# Patient Record
(demographics unavailable — no encounter records)

---

## 2017-08-07 DIAGNOSIS — N52.9 IMPOTENCE OF ORGANIC ORIGIN: ICD-10-CM

## 2017-08-07 DIAGNOSIS — I10 ESSENTIAL HYPERTENSION WITH GOAL BLOOD PRESSURE LESS THAN 140/90: ICD-10-CM

## 2017-08-07 DIAGNOSIS — E78.5 HYPERLIPIDEMIA LDL GOAL <130: ICD-10-CM

## 2017-08-07 NOTE — TELEPHONE ENCOUNTER
tadalafil (CIALIS) 20 MG tablet   Last Written Prescription Date: 07/14/2016  Last Fill Quantity: 8,  # refills: 11   Last Office Visit with [facility], [facility] [place] or [facility] prescribing provider: 10/31/2016                                             hydrochlorothiazide (HYDRODIURIL) 25 MG tablet       Last Written Prescription Date: 07/14/2016  Last Fill Quantity: 90, # refills: 3  Last Office Visit with [facility], [facility] [place] or [facility] prescribing provider: 10/31/2016       Potassium   Date Value Ref Range Status   07/14/2016 3.7 3.4 - 5.3 mmol/L Final     Creatinine   Date Value Ref Range Status   07/14/2016 0.80 0.66 - 1.25 mg/dL Final     BP Readings from Last 3 Encounters:   10/31/16 130/80   09/13/16 138/80   07/14/16 106/70     simvastatin (ZOCOR) 40 MG tablet       Last Written Prescription Date: 07/14/2016  Last Fill Quantity: 45, # refills: 3  Last Office Visit with [facility], [facility] [place] or [facility] prescribing provider: 10/31/2016       Lab Results   Component Value Date    CHOL 172 07/14/2016     Lab Results   Component Value Date    HDL 40 07/14/2016     Lab Results   Component Value Date     07/14/2016     Lab Results   Component Value Date    TRIG 137 07/14/2016     Lab Results   Component Value Date    CHOLHDLRATIO 3.5 04/03/2015

## 2017-08-08 RX ORDER — SIMVASTATIN 40 MG
20 TABLET ORAL AT BEDTIME
Qty: 45 TABLET | Refills: 0 | Status: SHIPPED | OUTPATIENT
Start: 2017-08-08

## 2017-08-08 RX ORDER — HYDROCHLOROTHIAZIDE 25 MG/1
25 TABLET ORAL DAILY
Qty: 30 TABLET | Refills: 0 | Status: SHIPPED | OUTPATIENT
Start: 2017-08-08

## 2017-08-08 RX ORDER — TADALAFIL 20 MG/1
10-20 TABLET ORAL DAILY PRN
Qty: 8 TABLET | Refills: 1 | Status: SHIPPED | OUTPATIENT
Start: 2017-08-08

## 2017-08-08 NOTE — TELEPHONE ENCOUNTER
Medication is being filled for 1 time refill only due to:  Patient needs to be seen because is due for yearly office visit.